# Patient Record
(demographics unavailable — no encounter records)

---

## 2024-10-16 NOTE — DISCUSSION/SUMMARY
[FreeTextEntry1] : 1. Bicuspid AV: will obtain echocardiogram and CTA of aorta to screen for aortopathy. No SBE prophylaxis necessary.  Office will call with results  Follow up in 1 year. [EKG obtained to assist in diagnosis and management of assessed problem(s)] : EKG obtained to assist in diagnosis and management of assessed problem(s)

## 2024-10-16 NOTE — HISTORY OF PRESENT ILLNESS
[FreeTextEntry1] : 21 year old male with PMHx of bicuspid AV and was being followed by pediatric cardiology up until about two years presents to establish care with an adult cardiologist. Patient with no chest pain, dyspnea or palpitations.  There is no history of MI, CVA, CHF, or previous coronary intervention.  No siblings or parents with bicuspid AV.